# Patient Record
Sex: FEMALE | Race: WHITE | URBAN - METROPOLITAN AREA
[De-identification: names, ages, dates, MRNs, and addresses within clinical notes are randomized per-mention and may not be internally consistent; named-entity substitution may affect disease eponyms.]

---

## 2020-09-16 ENCOUNTER — OFFICE VISIT (OUTPATIENT)
Dept: ORTHOPEDIC SURGERY | Age: 60
End: 2020-09-16
Payer: COMMERCIAL

## 2020-09-16 VITALS — WEIGHT: 174 LBS | BODY MASS INDEX: 30.83 KG/M2 | HEIGHT: 63 IN

## 2020-09-16 PROCEDURE — 99203 OFFICE O/P NEW LOW 30 MIN: CPT | Performed by: ORTHOPAEDIC SURGERY

## 2020-09-16 NOTE — PROGRESS NOTES
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 Has a about a 5 degree lack of full PIP extension and very slight compensatory hyperextension of the DIP joint consistent with a very mild swan-neck deformity. This is not been progressive. Her main concern was whether we need to do anything further with this lesion. 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 She does have autoimmune disease but has never had definitive diagnosis of rheumatoid arthritis. I would not recommend further operative procedure on this finger unless the mass gets quite large as these knuckle pads tend to have a almost 100% incidence of recurrence    Return in about 2 weeks (around 9/30/2020) for After rheumatoid lab profile. Chief Complaint:  Finger Pain (Had right small finger surgery for mass removal 2019. She also feels like there might be a mass in the index finger)      History of Present Illness  Eliecer Tabor is a 62 y.o. female.   Location: right hand Severity: moderate pain at times  Duration: one year Modifying factors: surgery  Associated symptoms: mass right small finger    Contributory History  Previous mass excision 2019 right small finger    Medical History    Current Outpatient Medications on File Prior to Visit   Medication Sig Dispense Refill    Fluticasone-Salmeterol (ADVAIR DISKUS IN) Inhale into the lungs      predniSONE (DELTASONE) 20 MG tablet 20mg 1 po bid for 5 days   10mg 1 po bid for 5 days   10mg 1po qd for 5 days 25 tablet 0    methylPREDNISolone Tests: Moderately tender to palpation. She also has deviation deformity to the index finger DIP joint        Additional Comments:     Additional Examinations:  X-Ray Findings: PA lateral oblique x-rays of the right hand do show osteoarthritic change of the index distal interphalangeal joint very minimal joint space narrowing in the small finger PIP joint  Additional Diagnostic Test Findings:    Office Procedures:      Time statement: This dictation was performed with a verbal recognition program. It is possible that there are still dictated errors within this office note. All efforts were made to ensure that this office note is accurate. Orders Placed This Encounter   Procedures    XR HAND RIGHT (MIN 3 VIEWS)     Standing Status:   Future     Number of Occurrences:   1     Standing Expiration Date:   9/15/2021    Sedimentation Rate     Standing Status:   Future     Standing Expiration Date:   9/16/2021    Rheumatoid Factor     Standing Status:   Future     Standing Expiration Date:   9/16/2021    Uric Acid     Standing Status:   Future     Standing Expiration Date:   9/16/2021    CBC     Standing Status:   Future     Standing Expiration Date:   9/16/2021    ASHUTOSH     Standing Status:   Future     Standing Expiration Date:   9/16/2021       Attestation: I have reviewed the chief complaint and history of present illness (including ROS and PFSH) and vital documentation by my staff and I agree with their documentation and have added where applicable.

## 2020-09-30 ENCOUNTER — OFFICE VISIT (OUTPATIENT)
Dept: ORTHOPEDIC SURGERY | Age: 60
End: 2020-09-30
Payer: COMMERCIAL

## 2020-09-30 VITALS — HEIGHT: 63 IN | BODY MASS INDEX: 30.83 KG/M2 | WEIGHT: 174 LBS

## 2020-09-30 PROCEDURE — 99212 OFFICE O/P EST SF 10 MIN: CPT | Performed by: ORTHOPAEDIC SURGERY

## 2020-09-30 NOTE — PROGRESS NOTES
Assessment: Slight swan-neck deformity after removal of a fibrous lesion off the dorsum of the PIP joint of her small finger. She has a mild swan-neck deformity of this digit. Treatment Plan: Her rheumatoid lab work-up does show a positive ASHUTOSH at 1-80 dilution but the remainder of the work-up is normal.  She does have multiple areas of autoimmune disease including Hashimoto's thyroiditis. I discussed that there is a high instead of false negative ASHUTOSH but in her case she feels like it is probably positive. I therefore recommended that she probably see a rheumatologist I would not recommend any further surgery on her knuckle pads    Return if symptoms worsen or fail to improve. History of Present Illness  Katharina Hagan is a 62 y.o. female. Location: Dorsum of the PIP joint small finger severity: Minimal discomfort duration: She had surgery several months ago modifying factors: Wearing Coban wrapping definitely helps associated symptoms: No associated numbness or tingling    Review of Systems  Pertinent items are noted in HPI  Denies fever chills, confusion and bowel and bladder active change  Complete Review of Systems reviewed from patient history form dated 9/16/2020 and available in the patients chart under the media tab. Vital Signs  Vitals:    09/30/20 1353   Weight: 174 lb (78.9 kg)   Height: 5' 3\" (1.6 m)     Body mass index is 30.82 kg/m².      Contributory History  Notable autoimmune problems    Medical History  Past Medical History:   Diagnosis Date    Allergic     Arthritis     Autoimmune disorder ([address]     Cancer ([address]     Celiac disease     Chronic fatigue     Depression     Fibromyalgia     GERD (gastroesophageal reflux disease)     Hyperlipidemia     Low blood pressure     Lung nodule     Osteoporosis     Pulmonary sarcoidosis (HCC)     Raynaud disease     Rheumatoid disease ([address]     Thyroid disease      Current Outpatient Medications on File Prior to Visit Medication Sig Dispense Refill    Fluticasone-Salmeterol (ADVAIR DISKUS IN) Inhale into the lungs      predniSONE (DELTASONE) 20 MG tablet 20mg 1 po bid for 5 days   10mg 1 po bid for 5 days   10mg 1po qd for 5 days 25 tablet 0    methylPREDNISolone (MEDROL DOSEPACK) 4 MG tablet Take by mouth as directed. 21 tablet 0    vitamin D-3 (CHOLECALCIFEROL) 5000 UNITS TABS Take 5,000 Units by mouth daily      zoledronic acid (RECLAST) 5 MG/100ML SOLN Infuse 5 mg intravenously once      DULoxetine HCl (CYMBALTA PO) Take by mouth      Levothyroxine Sodium (SYNTHROID PO)   Take 100 mcg by mouth       Amphetamine-Dextroamphetamine (ADDERALL PO)   Take 40 mg by mouth       OMEPRAZOLE PO   Take 40 mg by mouth       Polyethylene Glycol 3350 (MIRALAX PO) Take by mouth      celecoxib (CELEBREX) 100 MG capsule TAKE ONE CAPSULE BY MOUTH EVERY DAY 60 capsule 3    BIOTIN PO Take 200 mcg by mouth      Turmeric, Curcuma Longa, (CURCUMIN) POWD by Does not apply route      Omega-3-acid Ethyl Esters (LOVAZA PO) Take by mouth       No current facility-administered medications on file prior to visit.       Allergies   Allergen Reactions    Codeine     Flomax [Tamsulosin Hcl]     Gluten Meal      Social History     Socioeconomic History    Marital status:      Spouse name: Not on file    Number of children: Not on file    Years of education: Not on file    Highest education level: Not on file   Occupational History    Not on file   Social Needs    Financial resource strain: Not on file    Food insecurity     Worry: Not on file     Inability: Not on file    Transportation needs     Medical: Not on file     Non-medical: Not on file   Tobacco Use    Smoking status: Never Smoker    Smokeless tobacco: Never Used   Substance and Sexual Activity    Alcohol use: Not on file    Drug use: Not on file    Sexual activity: Not on file   Lifestyle    Physical activity     Days per week: Not on file     Minutes per

## 2020-11-08 ENCOUNTER — TELEPHONE (OUTPATIENT)
Dept: SCHEDULING | Age: 60
End: 2020-11-08

## 2021-10-02 DIAGNOSIS — D89.89 AUTOIMMUNE DISORDER (HCC): ICD-10-CM

## 2021-10-02 RX ORDER — COSYNTROPIN 0.25 MG/ML
250 INJECTION, POWDER, FOR SOLUTION INTRAMUSCULAR; INTRAVENOUS ONCE
Status: CANCELLED | OUTPATIENT
Start: 2021-10-04 | End: 2021-10-04

## 2021-10-14 ENCOUNTER — HOSPITAL ENCOUNTER (OUTPATIENT)
Dept: ONCOLOGY | Age: 61
Setting detail: INFUSION SERIES
Discharge: HOME | End: 2021-10-14
Payer: COMMERCIAL

## 2021-10-14 VITALS
OXYGEN SATURATION: 100 % | RESPIRATION RATE: 18 BRPM | SYSTOLIC BLOOD PRESSURE: 122 MMHG | TEMPERATURE: 98.4 F | DIASTOLIC BLOOD PRESSURE: 75 MMHG | HEART RATE: 69 BPM

## 2021-10-14 DIAGNOSIS — D89.89 AUTOIMMUNE DISORDER (HCC): Primary | ICD-10-CM

## 2021-10-14 LAB
CORTISOL 30 MIN: 23.8 UG/DL
CORTISOL 60 MIN: 25.9 UG/DL
CORTISOL BASE: 15.5 UG/DL

## 2021-10-14 PROCEDURE — 80400 ACTH STIMULATION PANEL: CPT

## 2021-10-14 PROCEDURE — 96374 THER/PROPH/DIAG INJ IV PUSH: CPT

## 2021-10-14 PROCEDURE — 99211 OFF/OP EST MAY X REQ PHY/QHP: CPT

## 2021-10-14 PROCEDURE — 6360000002 HC RX W HCPCS: Performed by: INTERNAL MEDICINE

## 2021-10-14 RX ORDER — COSYNTROPIN 0.25 MG/ML
250 INJECTION, POWDER, FOR SOLUTION INTRAMUSCULAR; INTRAVENOUS ONCE
Status: COMPLETED | OUTPATIENT
Start: 2021-10-14 | End: 2021-10-14

## 2021-10-14 RX ORDER — COSYNTROPIN 0.25 MG/ML
250 INJECTION, POWDER, FOR SOLUTION INTRAMUSCULAR; INTRAVENOUS ONCE
Status: CANCELLED | OUTPATIENT
Start: 2021-10-14 | End: 2021-10-14

## 2021-10-14 RX ADMIN — COSYNTROPIN 250 MCG: 0.25 INJECTION, POWDER, LYOPHILIZED, FOR SOLUTION INTRAMUSCULAR; INTRAVENOUS at 07:35

## 2021-10-14 NOTE — PROGRESS NOTES
Pt seen and assessed [facility] today for ACTH stimulation panel per orders from Ermelinda Freeman. Labs drawn per protocol prior to cosyntropin administration and 30 min /60 min post. Pt tolerated well and without incident. Pt verbalizes understanding of discharge instructions. Discharged ambulatory to home.

## 2023-07-17 DIAGNOSIS — D89.89 AUTOIMMUNE DISORDER (HCC): Primary | ICD-10-CM

## 2023-07-17 RX ORDER — ALBUTEROL SULFATE 90 UG/1
4 AEROSOL, METERED RESPIRATORY (INHALATION) PRN
OUTPATIENT
Start: 2023-07-17

## 2023-07-17 RX ORDER — SODIUM CHLORIDE 9 MG/ML
INJECTION, SOLUTION INTRAVENOUS CONTINUOUS
OUTPATIENT
Start: 2023-07-17

## 2023-07-17 RX ORDER — ONDANSETRON 2 MG/ML
8 INJECTION INTRAMUSCULAR; INTRAVENOUS
OUTPATIENT
Start: 2023-07-17

## 2023-07-17 RX ORDER — DIPHENHYDRAMINE HYDROCHLORIDE 50 MG/ML
50 INJECTION INTRAMUSCULAR; INTRAVENOUS
OUTPATIENT
Start: 2023-07-17

## 2023-07-17 RX ORDER — COSYNTROPIN 0.25 MG/ML
250 INJECTION, POWDER, FOR SOLUTION INTRAMUSCULAR; INTRAVENOUS ONCE
Status: CANCELLED | OUTPATIENT
Start: 2023-07-17 | End: 2023-07-17

## 2023-07-17 RX ORDER — EPINEPHRINE 1 MG/ML
0.3 INJECTION, SOLUTION INTRAMUSCULAR; SUBCUTANEOUS PRN
OUTPATIENT
Start: 2023-07-17

## 2023-07-17 RX ORDER — ACETAMINOPHEN 325 MG/1
650 TABLET ORAL
OUTPATIENT
Start: 2023-07-17

## 2023-07-19 RX ORDER — COSYNTROPIN 0.25 MG/ML
250 INJECTION, POWDER, FOR SOLUTION INTRAMUSCULAR; INTRAVENOUS ONCE
OUTPATIENT
Start: 2023-07-19 | End: 2023-07-19

## 2023-08-24 ENCOUNTER — HOSPITAL ENCOUNTER (OUTPATIENT)
Dept: ONCOLOGY | Age: 63
Setting detail: INFUSION SERIES
Discharge: HOME | End: 2023-08-24
Payer: COMMERCIAL

## 2023-08-24 VITALS
OXYGEN SATURATION: 96 % | RESPIRATION RATE: 16 BRPM | DIASTOLIC BLOOD PRESSURE: 72 MMHG | HEART RATE: 65 BPM | TEMPERATURE: 98.1 F | SYSTOLIC BLOOD PRESSURE: 111 MMHG

## 2023-08-24 DIAGNOSIS — D89.89 AUTOIMMUNE DISORDER (HCC): Primary | ICD-10-CM

## 2023-08-24 LAB
CORTIS 1H P 250 UG ACTH RIV SERPL-MCNC: 26.9 UG/DL
CORTIS 30M P 250 UG ACTH RIV SERPL-MCNC: 24.4 UG/DL
CORTIS SERPL-MCNC: NORMAL UG/DL

## 2023-08-24 PROCEDURE — 6360000002 HC RX W HCPCS: Performed by: INTERNAL MEDICINE

## 2023-08-24 PROCEDURE — 80400 ACTH STIMULATION PANEL: CPT

## 2023-08-24 PROCEDURE — 96374 THER/PROPH/DIAG INJ IV PUSH: CPT

## 2023-08-24 RX ORDER — LEUCOVORIN/PYRIDOX/MECOBALAMIN 4-50-2 MG
TABLET ORAL
COMMUNITY

## 2023-08-24 RX ORDER — ONDANSETRON 2 MG/ML
8 INJECTION INTRAMUSCULAR; INTRAVENOUS
OUTPATIENT
Start: 2023-08-26

## 2023-08-24 RX ORDER — TRAZODONE HYDROCHLORIDE 100 MG/1
75 TABLET ORAL NIGHTLY
COMMUNITY

## 2023-08-24 RX ORDER — ACETYLCYSTEINE 600 MG
CAPSULE ORAL
COMMUNITY

## 2023-08-24 RX ORDER — DOCUSATE SODIUM 100 MG/1
100 CAPSULE, LIQUID FILLED ORAL 2 TIMES DAILY
COMMUNITY

## 2023-08-24 RX ORDER — NALTREXONE HYDROCHLORIDE 50 MG/1
4.5 TABLET, FILM COATED ORAL DAILY
COMMUNITY

## 2023-08-24 RX ORDER — ALBUTEROL SULFATE 90 UG/1
4 AEROSOL, METERED RESPIRATORY (INHALATION) PRN
OUTPATIENT
Start: 2023-08-26

## 2023-08-24 RX ORDER — EZETIMIBE 10 MG/1
10 TABLET ORAL DAILY
COMMUNITY

## 2023-08-24 RX ORDER — ASPIRIN 81 MG/1
TABLET ORAL DAILY
COMMUNITY

## 2023-08-24 RX ORDER — SODIUM CHLORIDE 9 MG/ML
INJECTION, SOLUTION INTRAVENOUS CONTINUOUS
OUTPATIENT
Start: 2023-08-26

## 2023-08-24 RX ORDER — HYDROXYCHLOROQUINE SULFATE 200 MG/1
TABLET, FILM COATED ORAL DAILY
COMMUNITY

## 2023-08-24 RX ORDER — LEVOTHYROXINE SODIUM 0.07 MG/1
75 TABLET ORAL DAILY
COMMUNITY

## 2023-08-24 RX ORDER — EPINEPHRINE 1 MG/ML
0.3 INJECTION, SOLUTION INTRAMUSCULAR; SUBCUTANEOUS PRN
OUTPATIENT
Start: 2023-08-26

## 2023-08-24 RX ORDER — ACETAMINOPHEN 325 MG/1
650 TABLET ORAL
OUTPATIENT
Start: 2023-08-26

## 2023-08-24 RX ORDER — DEXTROAMPHETAMINE SACCHARATE, AMPHETAMINE ASPARTATE, DEXTROAMPHETAMINE SULFATE AND AMPHETAMINE SULFATE 7.5; 7.5; 7.5; 7.5 MG/1; MG/1; MG/1; MG/1
TABLET ORAL DAILY
COMMUNITY

## 2023-08-24 RX ORDER — COSYNTROPIN 0.25 MG/ML
250 INJECTION, POWDER, FOR SOLUTION INTRAMUSCULAR; INTRAVENOUS ONCE
Status: COMPLETED | OUTPATIENT
Start: 2023-08-24 | End: 2023-08-24

## 2023-08-24 RX ORDER — COSYNTROPIN 0.25 MG/ML
250 INJECTION, POWDER, FOR SOLUTION INTRAMUSCULAR; INTRAVENOUS ONCE
Status: CANCELLED | OUTPATIENT
Start: 2023-08-26 | End: 2023-08-26

## 2023-08-24 RX ORDER — DIPHENHYDRAMINE HYDROCHLORIDE 50 MG/ML
50 INJECTION INTRAMUSCULAR; INTRAVENOUS
OUTPATIENT
Start: 2023-08-26

## 2023-08-24 RX ADMIN — COSYNTROPIN 250 MCG: 0.25 INJECTION, POWDER, LYOPHILIZED, FOR SOLUTION INTRAMUSCULAR; INTRAVENOUS at 08:11

## 2023-08-24 NOTE — PROGRESS NOTES
Pt seen and assessed [facility] for ACTH stimulation panel per orders from Dr. Millie Jeffries. Labs drawn per protocol prior to cosyntropin administration and 30 min /60 min post. Pt tolerated well and without incident. Pt verbalizes understanding of discharge instructions. Discharged ambulatory to home.     Arabella Fine RN

## 2023-10-02 RX ORDER — HEPARIN 100 UNIT/ML
500 SYRINGE INTRAVENOUS PRN
OUTPATIENT
Start: 2023-10-02

## 2023-10-02 RX ORDER — SODIUM CHLORIDE 0.9 % (FLUSH) 0.9 %
5-40 SYRINGE (ML) INJECTION PRN
OUTPATIENT
Start: 2023-10-02

## 2023-10-02 RX ORDER — ALBUTEROL SULFATE 90 UG/1
4 AEROSOL, METERED RESPIRATORY (INHALATION) PRN
OUTPATIENT
Start: 2023-10-02

## 2023-10-02 RX ORDER — EPINEPHRINE 1 MG/ML
0.3 INJECTION, SOLUTION INTRAMUSCULAR; SUBCUTANEOUS PRN
OUTPATIENT
Start: 2023-10-02

## 2023-10-02 RX ORDER — DIPHENHYDRAMINE HYDROCHLORIDE 50 MG/ML
50 INJECTION INTRAMUSCULAR; INTRAVENOUS
OUTPATIENT
Start: 2023-10-02

## 2023-10-02 RX ORDER — SODIUM CHLORIDE 9 MG/ML
5-250 INJECTION, SOLUTION INTRAVENOUS PRN
OUTPATIENT
Start: 2023-10-02

## 2023-10-02 RX ORDER — COSYNTROPIN 0.25 MG/ML
250 INJECTION, POWDER, FOR SOLUTION INTRAMUSCULAR; INTRAVENOUS ONCE
OUTPATIENT
Start: 2023-10-02 | End: 2023-10-02

## 2023-10-02 RX ORDER — ACETAMINOPHEN 325 MG/1
650 TABLET ORAL
OUTPATIENT
Start: 2023-10-02

## 2023-10-02 RX ORDER — ONDANSETRON 2 MG/ML
8 INJECTION INTRAMUSCULAR; INTRAVENOUS
OUTPATIENT
Start: 2023-10-02

## 2023-10-02 RX ORDER — SODIUM CHLORIDE 9 MG/ML
INJECTION, SOLUTION INTRAVENOUS CONTINUOUS
OUTPATIENT
Start: 2023-10-02

## 2023-11-13 ENCOUNTER — HOSPITAL ENCOUNTER (OUTPATIENT)
Dept: ONCOLOGY | Age: 63
Setting detail: INFUSION SERIES
Discharge: HOME | End: 2023-11-13
Payer: COMMERCIAL

## 2023-11-13 VITALS
OXYGEN SATURATION: 96 % | DIASTOLIC BLOOD PRESSURE: 73 MMHG | RESPIRATION RATE: 16 BRPM | SYSTOLIC BLOOD PRESSURE: 122 MMHG | TEMPERATURE: 97.7 F | HEART RATE: 60 BPM

## 2023-11-13 DIAGNOSIS — D89.89 AUTOIMMUNE DISORDER (HCC): Primary | ICD-10-CM

## 2023-11-13 LAB
CORTIS 1H P 250 UG ACTH RIV SERPL-MCNC: 25.4 UG/DL
CORTIS 30M P 250 UG ACTH RIV SERPL-MCNC: 22.8 UG/DL
CORTIS SERPL-MCNC: 17.2 UG/DL

## 2023-11-13 PROCEDURE — 96374 THER/PROPH/DIAG INJ IV PUSH: CPT

## 2023-11-13 PROCEDURE — 6360000002 HC RX W HCPCS: Performed by: INTERNAL MEDICINE

## 2023-11-13 PROCEDURE — 80400 ACTH STIMULATION PANEL: CPT

## 2023-11-13 RX ORDER — DIPHENHYDRAMINE HYDROCHLORIDE 50 MG/ML
50 INJECTION INTRAMUSCULAR; INTRAVENOUS
OUTPATIENT
Start: 2023-11-13

## 2023-11-13 RX ORDER — COSYNTROPIN 0.25 MG/ML
250 INJECTION, POWDER, FOR SOLUTION INTRAMUSCULAR; INTRAVENOUS ONCE
Status: CANCELLED | OUTPATIENT
Start: 2023-11-13 | End: 2023-11-13

## 2023-11-13 RX ORDER — COSYNTROPIN 0.25 MG/ML
250 INJECTION, POWDER, FOR SOLUTION INTRAMUSCULAR; INTRAVENOUS ONCE
Status: COMPLETED | OUTPATIENT
Start: 2023-11-13 | End: 2023-11-13

## 2023-11-13 RX ORDER — ACETAMINOPHEN 325 MG/1
650 TABLET ORAL
OUTPATIENT
Start: 2023-11-13

## 2023-11-13 RX ORDER — ALBUTEROL SULFATE 90 UG/1
4 AEROSOL, METERED RESPIRATORY (INHALATION) PRN
OUTPATIENT
Start: 2023-11-13

## 2023-11-13 RX ORDER — SODIUM CHLORIDE 0.9 % (FLUSH) 0.9 %
5-40 SYRINGE (ML) INJECTION PRN
OUTPATIENT
Start: 2023-11-13

## 2023-11-13 RX ORDER — HEPARIN 100 UNIT/ML
500 SYRINGE INTRAVENOUS PRN
OUTPATIENT
Start: 2023-11-13

## 2023-11-13 RX ORDER — EPINEPHRINE 1 MG/ML
0.3 INJECTION, SOLUTION INTRAMUSCULAR; SUBCUTANEOUS PRN
OUTPATIENT
Start: 2023-11-13

## 2023-11-13 RX ORDER — ONDANSETRON 2 MG/ML
8 INJECTION INTRAMUSCULAR; INTRAVENOUS
OUTPATIENT
Start: 2023-11-13

## 2023-11-13 RX ORDER — SODIUM CHLORIDE 9 MG/ML
INJECTION, SOLUTION INTRAVENOUS CONTINUOUS
OUTPATIENT
Start: 2023-11-13

## 2023-11-13 RX ORDER — SODIUM CHLORIDE 9 MG/ML
5-250 INJECTION, SOLUTION INTRAVENOUS PRN
OUTPATIENT
Start: 2023-11-13

## 2023-11-13 RX ADMIN — COSYNTROPIN 250 MCG: 0.25 INJECTION, POWDER, LYOPHILIZED, FOR SOLUTION INTRAMUSCULAR; INTRAVENOUS at 08:13

## 2023-11-13 NOTE — PROGRESS NOTES
Pt seen and assessed [facility] for ACTH stimulation panel per orders from Dr. Abby Mason. Labs drawn per protocol prior to cosyntropin administration and 30 min /60 min post. Pt tolerated well and without incident. Pt verbalizes understanding of discharge instructions. Discharged ambulatory to home.     Natalie Le RN